# Patient Record
Sex: FEMALE
[De-identification: names, ages, dates, MRNs, and addresses within clinical notes are randomized per-mention and may not be internally consistent; named-entity substitution may affect disease eponyms.]

---

## 2018-12-13 ENCOUNTER — APPOINTMENT (OUTPATIENT)
Dept: ULTRASOUND IMAGING | Facility: CLINIC | Age: 63
End: 2018-12-13
Payer: COMMERCIAL

## 2018-12-13 ENCOUNTER — APPOINTMENT (OUTPATIENT)
Dept: MAMMOGRAPHY | Facility: CLINIC | Age: 63
End: 2018-12-13
Payer: COMMERCIAL

## 2018-12-13 ENCOUNTER — OUTPATIENT (OUTPATIENT)
Dept: OUTPATIENT SERVICES | Facility: HOSPITAL | Age: 63
LOS: 1 days | End: 2018-12-13
Payer: COMMERCIAL

## 2018-12-13 DIAGNOSIS — N60.19 DIFFUSE CYSTIC MASTOPATHY OF UNSPECIFIED BREAST: ICD-10-CM

## 2018-12-13 PROCEDURE — 77063 BREAST TOMOSYNTHESIS BI: CPT | Mod: 26

## 2018-12-13 PROCEDURE — 76641 ULTRASOUND BREAST COMPLETE: CPT

## 2018-12-13 PROCEDURE — 77063 BREAST TOMOSYNTHESIS BI: CPT

## 2018-12-13 PROCEDURE — 77067 SCR MAMMO BI INCL CAD: CPT | Mod: 26

## 2018-12-13 PROCEDURE — 77067 SCR MAMMO BI INCL CAD: CPT

## 2018-12-13 PROCEDURE — 76641 ULTRASOUND BREAST COMPLETE: CPT | Mod: 26,50

## 2019-04-09 ENCOUNTER — APPOINTMENT (OUTPATIENT)
Age: 64
End: 2019-04-09
Payer: COMMERCIAL

## 2019-04-09 VITALS
SYSTOLIC BLOOD PRESSURE: 120 MMHG | HEART RATE: 59 BPM | HEIGHT: 67 IN | DIASTOLIC BLOOD PRESSURE: 82 MMHG | WEIGHT: 165 LBS | BODY MASS INDEX: 25.9 KG/M2

## 2019-04-09 DIAGNOSIS — R52 PAIN, UNSPECIFIED: ICD-10-CM

## 2019-04-09 PROCEDURE — 99204 OFFICE O/P NEW MOD 45 MIN: CPT

## 2019-04-09 NOTE — ASSESSMENT
[FreeTextEntry1] : This is DORA MARQUEZ,  a 64 year-old female here for IE\par \par Generalized body aches - induced or exacerbated by stressful life event (her son ), with intrinsic rt shoulder pathology - however mild on MRI - pain made worse by overall elevated inflammatory state. \par \par 1. Rheum referral\par 2. Rt shoulder US - consider BT sheath CSI\par 3. PT x 1 mo\par 4. Elimination diet x 1 mo.  Supplements discussed. \par \par

## 2019-04-09 NOTE — HISTORY OF PRESENT ILLNESS
[FreeTextEntry1] : This is DORA MARQUEZ,  a pleasant 64 year-old female here for initial evaluation. \par \par Pains started a little under a year ago, she lost her son in April 2018.  She went to her PCP who ordered rheumatological labwork.  She was then sent to Dr. Patino who is an orthopedist, he did a cortisone injection that did not help her.  She had MRI of rt shoulder done showing mild supraspinatus and biceps tendinopathy. \par \par Additionally she feels that her lower extremities are "weak" (points to quadriceps) - no real pain, but aching feeling in the thighs.   Also has bilateral hip pain (points to region of iliac crests bilat) - no groin pain, comes an dgoes, no radiation, perhaps driving makes it works, she spends 3h/car daily driving to and from work.  \par \par She takes 4 Aleve every 4h occasionally.  Tried Cymbalta w/o relief.  Takes supplements otherwise, and tried acupuncture.  No PT.\par No numbness/tingling.  \par Occasional bloating.\par Walks 4-5 miles and bikerides on weekends. \par \par Pain Description:\par Pain Scale: 2-9/10\par Not made worse or improved by anything\par \par --------------------------------------------------------\par Imaging Review\par see scanned rt shoulder and cervical MRI.

## 2019-04-09 NOTE — PHYSICAL EXAM
[FreeTextEntry1] : General:  Awake and alert in no acute distress\par Psych: normal mood and affect\par HEENT: NC/AT, normal visual tracking\par Pulmonary: no resp distress, chest expansion appears symmetrical\par CV: extremities are warm and perfused\par Abd: non-distended\par Ext: no c/c/e\par \par Inspection:  No gross abnormalities, asymmetries. \par \par Palpation: mild TTP at bilat GT, no shoulder tenderness bilat.\par \par AROM: full ROM w/ pain in forward flexion and IR. \par Abduction\par Flexion\par IR/ER\par \par Strength: 5/5 in shoulder abduction, and upper trapezius strength as well as scapular stabilizers:\par 5/5 in EF, EE, WE, FF. \par 5/5 in hip abduction bilat\par 4/5 left hip flexion, 5/5 rt hip flexion\par \par Sensation: normal to light touch bilat\par \par \par Provocative:\par [place]' -ve \par [place] -ve\par [place] +ve (rt)\par Speeds: +ve (rt)\par \par -ve [facility] bilat.\par

## 2019-04-12 ENCOUNTER — TRANSCRIPTION ENCOUNTER (OUTPATIENT)
Age: 64
End: 2019-04-12

## 2019-04-23 ENCOUNTER — APPOINTMENT (OUTPATIENT)
Dept: ULTRASOUND IMAGING | Facility: CLINIC | Age: 64
End: 2019-04-23

## 2019-05-09 ENCOUNTER — APPOINTMENT (OUTPATIENT)
Dept: RHEUMATOLOGY | Facility: CLINIC | Age: 64
End: 2019-05-09
Payer: COMMERCIAL

## 2019-05-09 VITALS
SYSTOLIC BLOOD PRESSURE: 145 MMHG | HEIGHT: 67 IN | OXYGEN SATURATION: 97 % | BODY MASS INDEX: 27.15 KG/M2 | TEMPERATURE: 98.1 F | HEART RATE: 52 BPM | WEIGHT: 173 LBS | DIASTOLIC BLOOD PRESSURE: 94 MMHG

## 2019-05-09 DIAGNOSIS — M25.511 PAIN IN RIGHT SHOULDER: ICD-10-CM

## 2019-05-09 PROCEDURE — 99244 OFF/OP CNSLTJ NEW/EST MOD 40: CPT

## 2021-08-17 ENCOUNTER — TRANSCRIPTION ENCOUNTER (OUTPATIENT)
Age: 66
End: 2021-08-17

## 2021-08-19 ENCOUNTER — TRANSCRIPTION ENCOUNTER (OUTPATIENT)
Age: 66
End: 2021-08-19

## 2024-01-16 ENCOUNTER — APPOINTMENT (OUTPATIENT)
Dept: PAIN MANAGEMENT | Facility: CLINIC | Age: 69
End: 2024-01-16

## 2024-01-18 ENCOUNTER — APPOINTMENT (OUTPATIENT)
Dept: NEUROLOGY | Facility: CLINIC | Age: 69
End: 2024-01-18
Payer: MEDICARE

## 2024-01-18 VITALS
DIASTOLIC BLOOD PRESSURE: 88 MMHG | SYSTOLIC BLOOD PRESSURE: 130 MMHG | WEIGHT: 155 LBS | HEART RATE: 61 BPM | HEIGHT: 67 IN | BODY MASS INDEX: 24.33 KG/M2 | OXYGEN SATURATION: 98 %

## 2024-01-18 DIAGNOSIS — Z86.39 PERSONAL HISTORY OF OTHER ENDOCRINE, NUTRITIONAL AND METABOLIC DISEASE: ICD-10-CM

## 2024-01-18 DIAGNOSIS — M54.2 CERVICALGIA: ICD-10-CM

## 2024-01-18 PROCEDURE — 99204 OFFICE O/P NEW MOD 45 MIN: CPT

## 2024-01-18 RX ORDER — CYCLOBENZAPRINE HYDROCHLORIDE 5 MG/1
5 TABLET, FILM COATED ORAL 3 TIMES DAILY
Qty: 30 | Refills: 2 | Status: ACTIVE | COMMUNITY
Start: 2024-01-18 | End: 1900-01-01

## 2024-01-18 NOTE — REVIEW OF SYSTEMS
[Feeling Poorly] : feeling poorly [Neck Pain] : neck pain [As Noted in HPI] : as noted in HPI [Negative] : Heme/Lymph

## 2024-01-30 NOTE — PHYSICAL EXAM
[Sclera] : the sclera and conjunctiva were normal [PERRL With Normal Accommodation] : pupils were equal in size, round, reactive to light, with normal accommodation [Extraocular Movements] : extraocular movements were intact [Outer Ear] : the ears and nose were normal in appearance [Oropharynx] : the oropharynx was normal [Respiration, Rhythm And Depth] : normal respiratory rhythm and effort [Arterial Pulses Carotid] : carotid pulses were normal with no bruits [Full Pulse] : the pedal pulses are present [Edema] : there was no peripheral edema [Abnormal Walk] : normal gait [Nail Clubbing] : no clubbing  or cyanosis of the fingernails [Musculoskeletal - Swelling] : no joint swelling seen [Motor Tone] : muscle strength and tone were normal [Skin Color & Pigmentation] : normal skin color and pigmentation [Skin Turgor] : normal skin turgor [] : no rash [FreeTextEntry1] : crepitus, decreased ROM, trapezius muscle spasm

## 2024-01-30 NOTE — HISTORY OF PRESENT ILLNESS
[FreeTextEntry1] : CC: hx of migraine     Patient Referred by:     HPI:   Recently seen at for headaches. Currently taking medrol dose pack , some relief but still has pressure. Has sumatriptan.   Went to a wedding second week in December, next day woke with a terrible HA that lasted 2 weeks. Nothing helped, including sumatriptan. Went to see PCP again. Has been having neck stiffness and pain. She was recommended to have PT and see neurology. She has been going to PT for 2 weeks. She had CTH and CT neck.  She has been having swollen glands, muscle aches . Thought maybe she was fighting off a virus.  Asked her PCP to start her on a steroid pack. She feels better since starting 3 days ago. She had two Lyme tests which were negative. She also went to the [place] ED   HAs began: last 30s Location: can be left side on top and frontal, behind left eye Quality: vice , pressure, can be sharp Severity: can be 8/10, usually 2-3/10 Disability: MIDAS 49, missing social events, difficulty functioning at home, has had 2 ED visits Duration: Has lasted  week or a month.  Frequency: usually 1 day, this month has been daily Temporal course: Time of day: Pain Free between Attacks: not in past month Triggers: seasonal changes Relieving factors: leaning over with head on the bed, steroids, ice pack Exacerbating factors: exercise and Valsalva maneuver do not make headache worse Prodrome or postdrome:   Aura: none   Associated with menses: no   Ass'd Symptoms: Nausea - Vomiting - Photophobia - Phonophobia: - Osmophobia brain fog/difficulty concentrating Irritability Allodynia - Blurred Vision - Neck pain + Dizziness - anorexia +   Autonomic features: none   Additional Social/Work History: Occupation: retired, works as an advocate Habits: Caffeine: 1-2 cups coffee a day  ETOH:  glass of red wine few times a week but not lately      Tobacco: none       Drugs:  none  Exercise: regularly cycles or walks  Diet: vegetarian, no processes, eats protein with meals hydration: not well Ophthalmologic: had eye checked recently in past month, had new prescription  Sleep: been sleeping more, still  feels tired during the day, usually has insomnia, snores  little Dental: has saw jaw pain at times Sinus/Allergies: ha sinusitis, saw ENT, using Nasal spray Head Trauma: none Hypermobility: Psychiatric: son passed in 2018.    Sex Active/Pregnancy planning:             contraception: partial hysterectomy Menstruation:  regular/irregular                                age of menarche:                      age of menopause: 50's    Treatment present: Acute: sumatriptan- not helpful, medrol dose pack, tramadol- not helpful, ibuprofen 600mg Preventive:   Prior medications:   Non-pharmacologic Tx: acupuncture, Physical therapy   Imaging:EXAM: MRI BRAIN WO CON PROCEDURE DATE: Feb 7 2015 INTERPRETATION: Non-contrast MRI of the brain. CLINICAL INDICATION: Headaches FINDINGS:No acute infarct or hemorrhage is identified. Scattered foci of elevated signal intensity are noted within the white matter on FLAIR and T2-weighted images, nonspecific in appearance but likely representing microvascular ischemic change. Alternatively, this can be seen in the setting of migraine headache. No midline shift, extra-axial collection or mass is identified. The visualized orbits and paranasal sinuses are within normal limits. The flow voids at the skull base are within normal limits. IMPRESSION: Multiple nonspecific foci of increased FLAIR/T2 signal in the marrow abnormality within the white matter likely on the basis of microvascular ischemic change. White matter changes can be seen in patients with migraine headaches.  CT  cervical spine: grade I anterolisthesis C4-c5.  mild left facet arthrosis.    Labs: Jan 9, 24- cbc, cmp , WNL, urinalysis with high protein     FH: none

## 2024-01-30 NOTE — ASSESSMENT
[FreeTextEntry1] :   68-year-old woman with history of migraine headaches, recent episode of status migrainosus,  presenting with neck stiffness and pain   As well as feeling of muscle aches and swollen glands..   On examination she has decreased range of motion of the neck  with severe trapezius muscle spasm.    Workup Labs: , magnesium, B12, TSH, Vit D, ESR, CRP, Paty barr panel.      Imaging not indicated at this time. If there is a change in headache features or patient's condition then will re-consider imaging.   Acute: Flexeril 5mg TID as needed,  can go to 10mg if not.    Prevention: magnesium 400mg  Management of neck muscle spasm and cervicalgia:  Physical therapy outpatient including postural modification, strengthening, and range of motion exercises.  Education on proper sitting and sleeping position. Can consider chiropractic services with caution of manipulation of the neck. Massage therapy regularly. Alternating heat with cold packs Stretching exercises including neck rotation, neck tilting, scapular retraction, chin retraction, with particular focus on the trapezius and rotator cuff.  Can also consider muscle relaxant such as Flexeril 5mg PRN TID with caution to sedation and medication interactions.  Yoga, Vicente chi, or CBT are other useful therapies.  Can consider needling or trigger point injection inpatient or outpatient.   Advised to keep headache journal to track headache frequency, triggers, and response to treatment.   Discussed common side effects of prescribed medications and potential alternatives.   Follow up 3 months. Will come back sooner for TPI.   Counseled patient on the importance of maintaining a healthy lifestyle, including balanced diet, adequate hydration, stress management, proper sleep hygiene, and physical activity.   No pregnancy planning at this time. Discussed that if they are considering pregnancy or if they become pregnant medications should be stopped 1 month prior, except for CGRP monoclonal antibody antagonists which should be stopped 6 months prior to pregnancy planning.       Answered all questions and concerns to the best of my ability. Advised to call for any new or worsening symptoms.    In order to maintain continuity of care/prescription refills, patients must be seen on a yearly basis.   Total time spent on the day of the visit, including pre-visit and post-visit time was 55 minutes.

## 2024-02-12 ENCOUNTER — APPOINTMENT (OUTPATIENT)
Dept: NEUROLOGY | Facility: CLINIC | Age: 69
End: 2024-02-12
Payer: MEDICARE

## 2024-02-12 VITALS
HEART RATE: 63 BPM | DIASTOLIC BLOOD PRESSURE: 76 MMHG | HEIGHT: 67 IN | SYSTOLIC BLOOD PRESSURE: 128 MMHG | BODY MASS INDEX: 24.33 KG/M2 | OXYGEN SATURATION: 100 % | WEIGHT: 155 LBS

## 2024-02-12 DIAGNOSIS — M79.10 MYALGIA, UNSPECIFIED SITE: ICD-10-CM

## 2024-02-12 DIAGNOSIS — M62.838 OTHER MUSCLE SPASM: ICD-10-CM

## 2024-02-12 PROCEDURE — 20553 NJX 1/MLT TRIGGER POINTS 3/>: CPT

## 2024-02-12 NOTE — PROCEDURE
[FreeTextEntry1] : Trigger point injections [FreeTextEntry2] : Myalgia, cervicalgia, neck muscle spasm [FreeTextEntry3] : Procedure: Trigger Point Injections (20553) Diagnosis: Myalgia (m79.1) Complications: None Surgeon: MORENO Gutiérrez Anesthesia: None Procedure in detail: After explaining the potential risks of the above stated procedure which include infection, bleeding, bruising, no change in pain, increase in pain, nerve injury, local anesthetic side effect, muscle fibrosis, muscle weakness, pneumothorax, depression, euphoria, low blood pressure, low heart rate, abnormal heart rhythms (arrhythmias), cardiac arrest, and death, the patient agreed to undergo the procedure.  Using two 5 ml syringes, 10 ml of 2% lidocaine were drawn up (NDC 3009-7507-14)  Trigger points were palpated, and after confirmation from the patient regarding locations, the areas were cleaned with an alcohol swab. A 30 gauge 1/2 inch needle was inserted and advanced to the belly of the affected muscles, and after gentle aspiration, 0.5 to 1 mL was injected per site. A total of 10 ml was used.  The following muscles were injected: Right Upper Trapezius Right Lower Trapezius Left Upper Trapezius Left Lower Trapezius Right and Left Splenius Capitus and Cervicus   The patient tolerated the procedure well and reported that pain was significantly reduced.   Follow up in 4 weeks.

## 2024-03-19 ENCOUNTER — APPOINTMENT (OUTPATIENT)
Dept: NEUROLOGY | Facility: CLINIC | Age: 69
End: 2024-03-19